# Patient Record
Sex: MALE | Race: WHITE | HISPANIC OR LATINO | Employment: FULL TIME | URBAN - METROPOLITAN AREA
[De-identification: names, ages, dates, MRNs, and addresses within clinical notes are randomized per-mention and may not be internally consistent; named-entity substitution may affect disease eponyms.]

---

## 2017-01-07 ENCOUNTER — HOSPITAL ENCOUNTER (EMERGENCY)
Facility: HOSPITAL | Age: 22
Discharge: HOME | End: 2017-01-07
Attending: EMERGENCY MEDICINE | Admitting: EMERGENCY MEDICINE

## 2017-01-07 VITALS
DIASTOLIC BLOOD PRESSURE: 65 MMHG | BODY MASS INDEX: 22.49 KG/M2 | RESPIRATION RATE: 16 BRPM | OXYGEN SATURATION: 98 % | TEMPERATURE: 98 F | SYSTOLIC BLOOD PRESSURE: 115 MMHG | HEIGHT: 65 IN | HEART RATE: 95 BPM | WEIGHT: 135 LBS

## 2017-01-07 DIAGNOSIS — S93.601A FOOT SPRAIN, RIGHT, INITIAL ENCOUNTER: Primary | ICD-10-CM

## 2017-01-07 PROCEDURE — 99282 EMERGENCY DEPT VISIT SF MDM: CPT | Performed by: EMERGENCY MEDICINE

## 2017-01-07 PROCEDURE — 99283 EMERGENCY DEPT VISIT LOW MDM: CPT

## 2017-01-07 NOTE — ED PROVIDER NOTES
Subjective   History of Present Illness  History of Present Illness    Chief complaint: Foot pain    Location: Right foot, bottom of heel    Quality/Severity:  Moderate pain    Timing/Duration: Constant for 2 days    Modifying Factors: Worse with bearing weight    Narrative: Patient presents for evaluation of new onset pain in his right heel with some bruising.  He says that 2 days ago he hopped out of his bed quickly and landed on the ground forcefully in an awkward way.  He was going to try and run after one of his kids who is in the house but as he took those first few steps out of bed he realized that he had landed awkwardly on his foot and had immediate pain.  Since that time he has had continued pain and has only been able to put weight on the ball of his foot or his toes rather than planting onto his heel is normal.  He denies any other injuries.  He has not had any previous foot or ankle surgeries or problems in the past.    Associated Symptoms: None    Review of Systems   Constitutional: Negative for activity change and fever.   HENT: Negative.    Eyes: Negative for pain and visual disturbance.   Respiratory: Negative for cough and shortness of breath.    Cardiovascular: Negative for chest pain.   Gastrointestinal: Negative for abdominal pain.   Genitourinary: Negative for dysuria.   Musculoskeletal: Positive for arthralgias, gait problem and myalgias.   Skin: Negative for color change.   Neurological: Negative for syncope and headaches.   All other systems reviewed and are negative.      Past Medical History   Diagnosis Date   • Anxiety        Allergies   Allergen Reactions   • Tylenol [Acetaminophen]        Past Surgical History   Procedure Laterality Date   • Incision and drainage abscess         History reviewed. No pertinent family history.    Social History     Social History   • Marital status:      Spouse name: N/A   • Number of children: N/A   • Years of education: N/A     Social History  Main Topics   • Smoking status: Current Every Day Smoker     Packs/day: 0.50     Types: Cigarettes   • Smokeless tobacco: None   • Alcohol use 2.4 oz/week     4 Cans of beer per week   • Drug use: No   • Sexual activity: Not Asked     Other Topics Concern   • None     Social History Narrative   • None         ED Triage Vitals   Temp Heart Rate Resp BP SpO2   01/07/17 1700 01/07/17 1700 01/07/17 1700 01/07/17 1700 01/07/17 1700   98 °F (36.7 °C) 95 16 115/65 98 %      Temp src Heart Rate Source Patient Position BP Location FiO2 (%)   01/07/17 1700 01/07/17 1700 01/07/17 1700 01/07/17 1700 --   Oral Monitor Sitting Right arm        Objective   Physical Exam   Constitutional: He is oriented to person, place, and time. He appears well-developed and well-nourished.   HENT:   Head: Normocephalic and atraumatic.   Eyes: EOM are normal. Pupils are equal, round, and reactive to light. Right eye exhibits no discharge. Left eye exhibits no discharge.   Neck: Normal range of motion. Neck supple.   Cardiovascular: Normal rate and regular rhythm.    Pulmonary/Chest: Effort normal. No respiratory distress.   Musculoskeletal: Normal range of motion. He exhibits tenderness. He exhibits no edema or deformity.   The right plantar surface of the foot show some bruising in the calcaneus region only.  The arch of the foot and the toes are entirely normal and nontender to palpation without any signs of bruising or injury.  There is no tenderness to the Achilles tendon posteriorly at all.  Meyer squeeze test is negative for any suggestion of Achilles injury.  The lateral and medial malleolus are normal and nontender to palpation as well.   Neurological: He is alert and oriented to person, place, and time.   Skin: Skin is warm and dry. No rash noted. No erythema.   Psychiatric: He has a normal mood and affect. His behavior is normal. Judgment and thought content normal.   Nursing note and vitals reviewed.      Procedures         ED  Course  ED Course                  MDM    Final diagnoses:   Foot sprain, right, initial encounter            Abebe Posey MD  01/07/17 4743

## 2017-01-07 NOTE — DISCHARGE INSTRUCTIONS
Rest, ice, and elevate your injured foot as needed.  Please return to the emergency room for any worsening pain, swelling, bruising, weakness, numbness or any other concerns.  Otherwise follow up with the podiatry specialist for further evaluation as we suggested for further testing.

## 2017-04-11 ENCOUNTER — HOSPITAL ENCOUNTER (EMERGENCY)
Facility: HOSPITAL | Age: 22
Discharge: HOME | End: 2017-04-11
Attending: EMERGENCY MEDICINE | Admitting: EMERGENCY MEDICINE

## 2017-04-11 ENCOUNTER — APPOINTMENT (OUTPATIENT)
Dept: GENERAL RADIOLOGY | Facility: HOSPITAL | Age: 22
End: 2017-04-11

## 2017-04-11 VITALS
SYSTOLIC BLOOD PRESSURE: 115 MMHG | DIASTOLIC BLOOD PRESSURE: 67 MMHG | HEIGHT: 64 IN | HEART RATE: 58 BPM | BODY MASS INDEX: 24.75 KG/M2 | WEIGHT: 145 LBS | RESPIRATION RATE: 16 BRPM | OXYGEN SATURATION: 98 % | TEMPERATURE: 98.2 F

## 2017-04-11 DIAGNOSIS — J20.9 ACUTE BRONCHITIS, UNSPECIFIED ORGANISM: Primary | ICD-10-CM

## 2017-04-11 PROCEDURE — 99282 EMERGENCY DEPT VISIT SF MDM: CPT

## 2017-04-11 PROCEDURE — 99284 EMERGENCY DEPT VISIT MOD MDM: CPT | Performed by: EMERGENCY MEDICINE

## 2017-04-11 PROCEDURE — 71020 HC CHEST PA AND LATERAL: CPT

## 2017-04-11 RX ORDER — AMOXICILLIN 500 MG/1
500 CAPSULE ORAL 3 TIMES DAILY
Qty: 21 CAPSULE | Refills: 0 | Status: SHIPPED | OUTPATIENT
Start: 2017-04-11 | End: 2017-04-18

## 2017-04-11 NOTE — DISCHARGE INSTRUCTIONS
Please return to the emergency room for any worsening pain, fevers, coughing, bleeding, difficulties breathing or any other concerns.

## 2017-04-14 NOTE — ED PROVIDER NOTES
Subjective   History of Present Illness  History of Present Illness    Chief complaint: Cough, hemoptysis    Location: Upper respiratory    Quality/Severity:  Mild    Timing/Duration: Persistent for 2 weeks    Modifying Factors: None    Narrative: Patient presents for evaluation of worsening cough symptoms and now some cough with mild streaks of bright red blood since this morning.  He is a half pack per day smoker.  He denies any fevers.  He denies any chest pain or back pain.  He denies any difficulties breathing or any change in his activity levels recently.  He says that for the past 2 weeks he has had a persistent cough that is worse in usual.  It has been occasionally productive with some yellowish phlegm.  This morning, however, when he woke up and began coughing and had some mild streaks of blood which concerned him.  He denies any weight loss.  He has not taken any treatments for this cough.  He denies any sick contacts that are known.    Associated Symptoms: As above    Review of Systems   Constitutional: Negative for activity change, appetite change, chills, fatigue and fever.   HENT: Positive for congestion and rhinorrhea.    Eyes: Negative for pain and visual disturbance.   Respiratory: Positive for cough and wheezing. Negative for chest tightness, shortness of breath and stridor.    Cardiovascular: Negative for chest pain, palpitations and leg swelling.   Gastrointestinal: Negative for abdominal pain, nausea and vomiting.   Genitourinary: Negative for dysuria and flank pain.   Musculoskeletal: Negative for back pain and neck pain.   Skin: Negative for color change and wound.   Neurological: Negative for syncope and headaches.   Psychiatric/Behavioral: Negative for agitation and confusion.   All other systems reviewed and are negative.      Past Medical History:   Diagnosis Date   • Anxiety        Allergies   Allergen Reactions   • Tylenol [Acetaminophen]        Past Surgical History:   Procedure  Laterality Date   • INCISION AND DRAINAGE ABSCESS         History reviewed. No pertinent family history.    Social History     Social History   • Marital status:      Spouse name: N/A   • Number of children: N/A   • Years of education: N/A     Social History Main Topics   • Smoking status: Current Every Day Smoker     Packs/day: 0.50     Types: Cigarettes   • Smokeless tobacco: None   • Alcohol use 2.4 oz/week     4 Cans of beer per week      Comment: monthly   • Drug use: No   • Sexual activity: Not Asked     Other Topics Concern   • None     Social History Narrative   • None     ED Triage Vitals   Temp Heart Rate Resp BP SpO2   04/11/17 1644 04/11/17 1644 04/11/17 1644 04/11/17 1644 04/11/17 1644   98.2 °F (36.8 °C) 58 16 115/67 98 %      Temp src Heart Rate Source Patient Position BP Location FiO2 (%)   -- -- -- -- --                  Objective   Physical Exam   Constitutional: He is oriented to person, place, and time. He appears well-developed and well-nourished. No distress.   HENT:   Head: Normocephalic and atraumatic.   Eyes: EOM are normal. Pupils are equal, round, and reactive to light. Right eye exhibits no discharge. Left eye exhibits no discharge.   Neck: Normal range of motion. Neck supple. No tracheal deviation present.   Cardiovascular: Normal rate, regular rhythm, normal heart sounds and intact distal pulses.  Exam reveals no gallop and no friction rub.    No murmur heard.  Pulmonary/Chest: Effort normal. No respiratory distress. He has no wheezes. He has no rales. He exhibits no tenderness.   Lungs clear bilaterally with normal work of breathing noted.  No coughing throughout the entire history and physical.   Abdominal: Soft. He exhibits no mass. There is no rebound and no guarding. No hernia.   Musculoskeletal: Normal range of motion. He exhibits no edema or deformity.   Neurological: He is alert and oriented to person, place, and time.   Skin: Skin is warm and dry. No rash noted. He is  not diaphoretic. No erythema.   Psychiatric: He has a normal mood and affect. His behavior is normal. Judgment and thought content normal.   Nursing note and vitals reviewed.    RADIOLOGY        Study: Chest x-ray    Findings: No acute disease    Interpreted Contemporaneously by myself, independently viewed by me        Procedures         ED Course  ED Course   Comment By Time   I interpreted the chest x-ray with no acute disease at this time.  Patient vital signs are reassuring.  No evidence of hemoptysis at this time.  Presume his report of blood streaks were likely due to upper respiratory infection.  We'll treat accordingly.  Encouraged him to quit smoking. Abebe Posey MD 04/14 0792                  MDM  Number of Diagnoses or Management Options  Acute bronchitis, unspecified organism:      Amount and/or Complexity of Data Reviewed  Tests in the radiology section of CPT®: ordered and reviewed  Independent visualization of images, tracings, or specimens: yes    Risk of Complications, Morbidity, and/or Mortality  Presenting problems: moderate  Diagnostic procedures: low  Management options: moderate        Final diagnoses:   Acute bronchitis, unspecified organism            Abebe Posey MD  04/14/17 0758

## 2019-12-01 ENCOUNTER — HOSPITAL ENCOUNTER (EMERGENCY)
Facility: HOSPITAL | Age: 24
Discharge: HOME | End: 2019-12-01
Attending: EMERGENCY MEDICINE | Admitting: EMERGENCY MEDICINE

## 2019-12-01 ENCOUNTER — APPOINTMENT (OUTPATIENT)
Dept: GENERAL RADIOLOGY | Facility: HOSPITAL | Age: 24
End: 2019-12-01

## 2019-12-01 ENCOUNTER — HOSPITAL ENCOUNTER (OUTPATIENT)
Dept: CARDIOLOGY | Facility: HOSPITAL | Age: 24
Discharge: HOME | End: 2019-12-01

## 2019-12-01 VITALS
BODY MASS INDEX: 24.75 KG/M2 | WEIGHT: 145 LBS | TEMPERATURE: 98.2 F | RESPIRATION RATE: 16 BRPM | HEIGHT: 64 IN | SYSTOLIC BLOOD PRESSURE: 115 MMHG | DIASTOLIC BLOOD PRESSURE: 80 MMHG | HEART RATE: 66 BPM | OXYGEN SATURATION: 98 %

## 2019-12-01 DIAGNOSIS — R00.2 PALPITATIONS: ICD-10-CM

## 2019-12-01 DIAGNOSIS — R07.9 CHEST PAIN, UNSPECIFIED TYPE: ICD-10-CM

## 2019-12-01 DIAGNOSIS — R00.2 PALPITATIONS: Primary | ICD-10-CM

## 2019-12-01 LAB
ALBUMIN SERPL-MCNC: 4.8 G/DL (ref 3.5–5.2)
ALBUMIN/GLOB SERPL: 1.5 G/DL
ALP SERPL-CCNC: 54 U/L (ref 39–117)
ALT SERPL W P-5'-P-CCNC: 11 U/L (ref 1–41)
AMPHET+METHAMPHET UR QL: NEGATIVE
AMPHETAMINES UR QL: NEGATIVE
ANION GAP SERPL CALCULATED.3IONS-SCNC: 14.3 MMOL/L (ref 5–15)
AST SERPL-CCNC: 16 U/L (ref 1–40)
BARBITURATES UR QL SCN: NEGATIVE
BASOPHILS # BLD AUTO: 0.05 10*3/MM3 (ref 0–0.2)
BASOPHILS NFR BLD AUTO: 0.6 % (ref 0–1.5)
BENZODIAZ UR QL SCN: NEGATIVE
BILIRUB SERPL-MCNC: 1.3 MG/DL (ref 0.2–1.2)
BUN BLD-MCNC: 14 MG/DL (ref 6–20)
BUN/CREAT SERPL: 14.3 (ref 7–25)
BUPRENORPHINE SERPL-MCNC: NEGATIVE NG/ML
CALCIUM SPEC-SCNC: 9.3 MG/DL (ref 8.6–10.5)
CANNABINOIDS SERPL QL: POSITIVE
CHLORIDE SERPL-SCNC: 100 MMOL/L (ref 98–107)
CO2 SERPL-SCNC: 25.7 MMOL/L (ref 22–29)
COCAINE UR QL: NEGATIVE
CREAT BLD-MCNC: 0.98 MG/DL (ref 0.76–1.27)
DEPRECATED RDW RBC AUTO: 40.7 FL (ref 37–54)
EOSINOPHIL # BLD AUTO: 0.03 10*3/MM3 (ref 0–0.4)
EOSINOPHIL NFR BLD AUTO: 0.3 % (ref 0.3–6.2)
ERYTHROCYTE [DISTWIDTH] IN BLOOD BY AUTOMATED COUNT: 13.8 % (ref 12.3–15.4)
ETHANOL BLD-MCNC: <10 MG/DL (ref 0–10)
ETHANOL UR QL: <0.01 %
GFR SERPL CREATININE-BSD FRML MDRD: 94 ML/MIN/1.73
GLOBULIN UR ELPH-MCNC: 3.1 GM/DL
GLUCOSE BLD-MCNC: 104 MG/DL (ref 65–99)
HCT VFR BLD AUTO: 41.8 % (ref 37.5–51)
HGB BLD-MCNC: 13 G/DL (ref 13–17.7)
IMM GRANULOCYTES # BLD AUTO: 0.03 10*3/MM3 (ref 0–0.05)
IMM GRANULOCYTES NFR BLD AUTO: 0.3 % (ref 0–0.5)
LYMPHOCYTES # BLD AUTO: 1.38 10*3/MM3 (ref 0.7–3.1)
LYMPHOCYTES NFR BLD AUTO: 15.6 % (ref 19.6–45.3)
MCH RBC QN AUTO: 25.4 PG (ref 26.6–33)
MCHC RBC AUTO-ENTMCNC: 31.1 G/DL (ref 31.5–35.7)
MCV RBC AUTO: 81.8 FL (ref 79–97)
METHADONE UR QL SCN: NEGATIVE
MONOCYTES # BLD AUTO: 1.04 10*3/MM3 (ref 0.1–0.9)
MONOCYTES NFR BLD AUTO: 11.8 % (ref 5–12)
NEUTROPHILS # BLD AUTO: 6.32 10*3/MM3 (ref 1.7–7)
NEUTROPHILS NFR BLD AUTO: 71.4 % (ref 42.7–76)
NRBC BLD AUTO-RTO: 0 /100 WBC (ref 0–0.2)
OPIATES UR QL: NEGATIVE
OXYCODONE UR QL SCN: NEGATIVE
PCP UR QL SCN: NEGATIVE
PLATELET # BLD AUTO: 335 10*3/MM3 (ref 140–450)
PMV BLD AUTO: 10.1 FL (ref 6–12)
POTASSIUM BLD-SCNC: 4 MMOL/L (ref 3.5–5.2)
PROPOXYPH UR QL: NEGATIVE
PROT SERPL-MCNC: 7.9 G/DL (ref 6–8.5)
RBC # BLD AUTO: 5.11 10*6/MM3 (ref 4.14–5.8)
SODIUM BLD-SCNC: 140 MMOL/L (ref 136–145)
TRICYCLICS UR QL SCN: NEGATIVE
TROPONIN T SERPL-MCNC: <0.01 NG/ML (ref 0–0.03)
TSH SERPL DL<=0.05 MIU/L-ACNC: 1.77 UIU/ML (ref 0.27–4.2)
WBC NRBC COR # BLD: 8.85 10*3/MM3 (ref 3.4–10.8)

## 2019-12-01 PROCEDURE — 93225 XTRNL ECG REC<48 HRS REC: CPT

## 2019-12-01 PROCEDURE — 99284 EMERGENCY DEPT VISIT MOD MDM: CPT | Performed by: EMERGENCY MEDICINE

## 2019-12-01 PROCEDURE — 84443 ASSAY THYROID STIM HORMONE: CPT | Performed by: EMERGENCY MEDICINE

## 2019-12-01 PROCEDURE — 99283 EMERGENCY DEPT VISIT LOW MDM: CPT

## 2019-12-01 PROCEDURE — 71045 X-RAY EXAM CHEST 1 VIEW: CPT

## 2019-12-01 PROCEDURE — 93226 XTRNL ECG REC<48 HR SCAN A/R: CPT

## 2019-12-01 PROCEDURE — 80053 COMPREHEN METABOLIC PANEL: CPT | Performed by: EMERGENCY MEDICINE

## 2019-12-01 PROCEDURE — 93010 ELECTROCARDIOGRAM REPORT: CPT | Performed by: INTERNAL MEDICINE

## 2019-12-01 PROCEDURE — 85025 COMPLETE CBC W/AUTO DIFF WBC: CPT | Performed by: EMERGENCY MEDICINE

## 2019-12-01 PROCEDURE — 93005 ELECTROCARDIOGRAM TRACING: CPT | Performed by: EMERGENCY MEDICINE

## 2019-12-01 PROCEDURE — 84484 ASSAY OF TROPONIN QUANT: CPT | Performed by: EMERGENCY MEDICINE

## 2019-12-01 PROCEDURE — 80307 DRUG TEST PRSMV CHEM ANLYZR: CPT | Performed by: EMERGENCY MEDICINE

## 2019-12-02 ENCOUNTER — TRANSCRIBE ORDERS (OUTPATIENT)
Dept: ADMINISTRATIVE | Facility: HOSPITAL | Age: 24
End: 2019-12-02

## 2019-12-02 DIAGNOSIS — R00.2 PALPITATIONS: Primary | ICD-10-CM

## 2019-12-02 NOTE — ED NOTES
"Pt presented to the ED with c/o intermittent midsternal CP for a year. States last December he had CP that hurt so badly he \"passed out\". He did not seek treatment or go to the doctor and just \"ignored it\" after that. Pt says that for the last year he every now and then will get dull aches of pain mid chest. Says he went drinking with his buddies last week and every since the CP has become more frequent and had sharp shooting pains tonight. Pt says he is not having any \"real pain\" currently. describes it as a dull ache that \"doesn't feel right\". Pt denies any SOB, dizziness, cough. A&Ox4. Ambulates with steady gait. Pt resting comfortably on ED stretcher, is in NAD.      Lexii Espinoza, RN  12/01/19 1952    "

## 2019-12-02 NOTE — ED PROVIDER NOTES
Subjective   History of Present Illness  History of Present Illness    Chief complaint: Chest pain    Location: Substernal    Quality/Severity: pressure,    Timing/Onset/Duration: Intermittent since the night before Thanksgiving    Modifying Factors: Better with time    Associated Symptoms: No headache.  No fever chills or cough.  No sore throat earache or nasal congestion.  No abdominal pain.  No diarrhea or burning when he urinates.  No nausea or vomiting.  Patient did not have shortness of breath.    Narrative: This 24-year-old male presents with intermittent chest pain.  Last December the patient had palpitations with syncope.  He did not follow-up with a physician.  Patient was drinking the night before Thanksgiving and when he woke up he experienced the chest pains.  The patient does smoke marijuana, he drinks alcohol and does not take any other illicit drugs.  He has not had caffeine recently.  He does have a history of anxiety disorder    PCP:  None  Review of Systems   Constitutional: Negative for chills and fever.   HENT: Negative for ear pain and sore throat.    Eyes: Negative for discharge and redness.   Respiratory: Negative for cough, chest tightness and shortness of breath.    Cardiovascular: Positive for chest pain and palpitations. Negative for leg swelling.   Gastrointestinal: Negative for abdominal pain, blood in stool, constipation, diarrhea, nausea and vomiting.   Genitourinary: Negative for difficulty urinating and dysuria.   Musculoskeletal: Negative for back pain and neck pain.   Skin: Negative for rash.   Neurological: Negative for weakness, numbness and headaches.   Psychiatric/Behavioral: Negative.  Negative for confusion.        Medication List      You have not been prescribed any medications.       Past Medical History:   Diagnosis Date   • Anxiety        Allergies   Allergen Reactions   • Tylenol [Acetaminophen]        Past Surgical History:   Procedure Laterality Date   • INCISION AND  DRAINAGE ABSCESS         No family history on file.    Social History     Socioeconomic History   • Marital status:      Spouse name: Not on file   • Number of children: Not on file   • Years of education: Not on file   • Highest education level: Not on file   Tobacco Use   • Smoking status: Current Every Day Smoker     Packs/day: 0.50     Types: Cigarettes   Substance and Sexual Activity   • Alcohol use: Yes     Alcohol/week: 2.4 oz     Types: 4 Cans of beer per week     Comment: monthly   • Drug use: No           Objective   Physical Exam   Constitutional: He is oriented to person, place, and time. He appears well-developed and well-nourished. No distress.   ED Triage Vitals (12/01/19 1943)  Temp: 98.2 °F (36.8 °C)  Heart Rate: 65  Resp: 18  BP: 119/84  SpO2: 99 %  Temp src: Oral  Heart Rate Source: n/a  Patient Position: n/a  BP Location: n/a  FiO2 (%): n/a    The patient's vitals were reviewed by me.  Unless otherwise noted they are within normal limits.     HENT:   Head: Normocephalic and atraumatic.   Right Ear: External ear normal.   Left Ear: External ear normal.   Nose: Nose normal.   Mouth/Throat: Oropharynx is clear and moist.   Eyes: Conjunctivae and EOM are normal. Pupils are equal, round, and reactive to light. Right eye exhibits no discharge. Left eye exhibits no discharge.   Neck: Normal range of motion. Neck supple. No JVD present. No tracheal deviation present. No thyromegaly present.   Cardiovascular: Normal rate, regular rhythm, normal heart sounds and intact distal pulses. Exam reveals no gallop and no friction rub.   No murmur heard.  Pulmonary/Chest: Effort normal and breath sounds normal. No stridor. No respiratory distress. He has no wheezes. He has no rales. He exhibits no tenderness.   Abdominal: Soft. Bowel sounds are normal. He exhibits no distension and no mass. There is no tenderness. There is no rebound and no guarding. No hernia.   Musculoskeletal: Normal range of motion. He  exhibits no edema or deformity.        Right lower leg: Normal. He exhibits no tenderness and no edema.        Left lower leg: Normal. He exhibits no tenderness and no edema.   Lymphadenopathy:     He has no cervical adenopathy.   Neurological: He is alert and oriented to person, place, and time.   Skin: Skin is warm and dry. No rash noted. He is not diaphoretic. No erythema. No pallor.   Psychiatric: His behavior is normal.   Nursing note and vitals reviewed.      Procedures           ED Course  ED Course as of Dec 01 2053   Sun Dec 01, 2019   2043 The laboratory values were reviewed by me.  The urine drug screen is positive for THC.  The glucose is 104.  The laboratory values are otherwise unremarkable.  [RC]      ED Course User Index  [RC] Franky Taylor MD      7:45 PM, 12/01/19:  The EKG was obtained at 1940.  EKG was read by me at 1941.  The EKG shows a sinus bradycardia with a rate of 58.  There is a normal axis with no hypertrophy.  The FL, QRS, QT intervals are unremarkable.  There is no ectopy.  There is ST elevation is probably early re-pole pattern.  There is no acute ST elevation or depression.  8:53 PM, 12/01/19:  The patient was reassessed.  He has no complaints.  He has no chest pain or shortness of breath his vital signs were reviewed and are stable.    8:54 PM, 12/01/19:  Patient's diagnosis of chest pain with palpitations was discussed with him.  The patient should avoid caffeine and stimulants.  Avoid alcohol.  Patient will be set up with a Holter monitor to follow-up with Dr. Mcfarland after the Holter.  Return to the emergency department if there is palpitations, chest pain, shortness of breath, worse in any way at all.            MDM    Final diagnoses:   Palpitations   Chest pain, unspecified type              Franky Taylor MD  12/01/19 4764

## 2019-12-02 NOTE — DISCHARGE INSTRUCTIONS
Avoid caffeine and alcohol.  Follow-up with Dr. Mcfarland after the Holter.  Return to the emergency department if there is further chest pain, shortness of breath, worse in any way at all.

## 2019-12-03 PROCEDURE — 93227 XTRNL ECG REC<48 HR R&I: CPT | Performed by: INTERNAL MEDICINE

## 2020-03-27 ENCOUNTER — HOSPITAL ENCOUNTER (EMERGENCY)
Facility: HOSPITAL | Age: 25
Discharge: HOME | End: 2020-03-27
Attending: EMERGENCY MEDICINE | Admitting: EMERGENCY MEDICINE

## 2020-03-27 ENCOUNTER — NURSE TRIAGE (OUTPATIENT)
Dept: CALL CENTER | Facility: HOSPITAL | Age: 25
End: 2020-03-27

## 2020-03-27 VITALS — WEIGHT: 160 LBS | BODY MASS INDEX: 26.66 KG/M2 | HEIGHT: 65 IN

## 2020-03-27 VITALS
BODY MASS INDEX: 26.66 KG/M2 | HEIGHT: 65 IN | WEIGHT: 160 LBS | OXYGEN SATURATION: 98 % | DIASTOLIC BLOOD PRESSURE: 71 MMHG | HEART RATE: 79 BPM | TEMPERATURE: 98.4 F | SYSTOLIC BLOOD PRESSURE: 121 MMHG | RESPIRATION RATE: 15 BRPM

## 2020-03-27 DIAGNOSIS — G54.2 PINCHED CERVICAL NERVE ROOT: Primary | ICD-10-CM

## 2020-03-27 PROCEDURE — 99282 EMERGENCY DEPT VISIT SF MDM: CPT | Performed by: PHYSICIAN ASSISTANT

## 2020-03-27 PROCEDURE — 99282 EMERGENCY DEPT VISIT SF MDM: CPT

## 2020-03-27 RX ORDER — METHOCARBAMOL 750 MG/1
750 TABLET, FILM COATED ORAL 3 TIMES DAILY PRN
Qty: 20 TABLET | Refills: 0 | Status: SHIPPED | OUTPATIENT
Start: 2020-03-27 | End: 2021-09-07

## 2020-03-27 RX ORDER — ESCITALOPRAM OXALATE 10 MG/1
10 TABLET ORAL DAILY
COMMUNITY
End: 2021-09-07

## 2020-03-27 RX ORDER — NAPROXEN 375 MG/1
375 TABLET ORAL 2 TIMES DAILY PRN
Qty: 20 TABLET | Refills: 0 | Status: SHIPPED | OUTPATIENT
Start: 2020-03-27 | End: 2021-09-07

## 2020-03-27 NOTE — TELEPHONE ENCOUNTER
"He has difficult symptoms, started out with chest pain, then says when raises neck runs down to chest rated pain 5, no radiating to arms or shoulders, no numbness in feet legs or arms, but definetely has pain and radiating when raises neck up or even drinking has pain shooting to chest, has sob with playing with kids outside told needs to be seen.     Reason for Disposition  • [1] SEVERE neck pain (e.g., excruciating, unable to do any normal activities) AND [2] not improved after 2 hours of pain medicine    Additional Information  • Negative: Shock suspected (e.g., cold/pale/clammy skin, too weak to stand, low BP, rapid pulse)  • Negative: Difficult to awaken or acting confused (e.g., disoriented, slurred speech)  • Negative: [1] Similar pain previously AND [2] it was from \"heart attack\"  • Negative: [1] Similar pain previously AND [2] it was from \"angina\" AND [3] not relieved by nitroglycerin  • Negative: Sounds like a life-threatening emergency to the triager  • Negative: Followed a neck injury (e.g., MVA, sports, impact or collision)  • Negative: Chest pain  • Negative: Lymph node in the neck is swollen or painful to the touch  • Negative: Sore throat is main symptom  • Negative: Difficulty breathing or unusual sweating (e.g., sweating without exertion)  • Negative: [1] Stiff neck (can't put chin to chest) AND [2] headache  • Negative: [1] Stiff neck (can't put chin to chest) AND [2] fever  • Negative: Weakness of an arm or hand  • Negative: Problems with bowel or bladder control  • Negative: Head is twisting to one side (or ask \"is it turning against your will?\")  • Negative: Patient sounds very sick or weak to the triager  • Negative: [1] Fever > 100.0 F (37.8 C) AND [2] IVDA (intravenous drug abuse)  • Negative: [1] Fever > 100.0 F (37.8 C) AND [2] diabetes mellitus or weak immune system (e.g., HIV positive, cancer chemo, splenectomy, organ transplant, chronic steroids)  • Negative: Numbness in an arm or hand " "(i.e., loss of sensation)  • Negative: Tenderness or swelling of front of neck over windpipe  • Negative: Rash in same area as pain (may be described as \"small blisters\")  • Negative: High-risk adult (e.g., history of cancer, HIV, or IV drug abuse)    Answer Assessment - Initial Assessment Questions  1. LOCATION: \"Where does it hurt?\"        Middle chest to back if lifts head up  2. RADIATION: \"Does the pain go anywhere else?\" (e.g., into neck, jaw, arms, back)      From neck to back  3. ONSET: \"When did the chest pain begin?\" (Minutes, hours or days)       Few hours ago  4. PATTERN \"Does the pain come and go, or has it been constant since it started?\"  \"Does it get worse with exertion?\"       From neck to chest  5. DURATION: \"How long does it last\" (e.g., seconds, minutes, hours)      Every time he moves head  6. SEVERITY: \"How bad is the pain?\"  (e.g., Scale 1-10; mild, moderate, or severe)     - MILD (1-3): doesn't interfere with normal activities      - MODERATE (4-7): interferes with normal activities or awakens from sleep     - SEVERE (8-10): excruciating pain, unable to do any normal activities        5  7. CARDIAC RISK FACTORS: \"Do you have any history of heart problems or risk factors for heart disease?\" (e.g., prior heart attack, angina; high blood pressure, diabetes, being overweight, high cholesterol, smoking, or strong family history of heart disease)      no  8. PULMONARY RISK FACTORS: \"Do you have any history of lung disease?\"  (e.g., blood clots in lung, asthma, emphysema, birth control pills)      no  9. CAUSE: \"What do you think is causing the chest pain?\"      From my neck maybe does not know  10. OTHER SYMPTOMS: \"Do you have any other symptoms?\" (e.g., dizziness, nausea, vomiting, sweating, fever, difficulty breathing, cough)        Sob with activity, diarrhea  11. PREGNANCY: \"Is there any chance you are pregnant?\" \"When was your last menstrual period?\"        no    Answer Assessment - Initial " "Assessment Questions  1. ONSET: \"When did the pain begin?\"       This morning started  2. LOCATION: \"Where does it hurt?\"       From neck to mid chest  3. PATTERN \"Does the pain come and go, or has it been constant since it started?\"       Comes when takes drink or raises neck  4. SEVERITY: \"How bad is the pain?\"  (Scale 1-10; or mild, moderate, severe)    - MILD (1-3): doesn't interfere with normal activities     - MODERATE (4-7): interferes with normal activities or awakens from sleep     - SEVERE (8-10):  excruciating pain, unable to do any normal activities       5  5. RADIATION: \"Does the pain go anywhere else, shoot into your arms?\"      Radiates from neck to mid chest   6. CORD SYMPTOMS: \"Any weakness or numbness of the arms or legs?\"      no  7. CAUSE: \"What do you think is causing the neck pain?\"      Playing  With kids jumping on [address] but started before this  8. NECK OVERUSE: \"Any recent activities that involved turning or twisting the neck?\"      Nothing yesterday  9. OTHER SYMPTOMS: \"Do you have any other symptoms?\" (e.g., headache, fever, chest pain, difficulty breathing, neck swelling)      Headache, chest pain neck pain moving it up  10. PREGNANCY: \"Is there any chance you are pregnant?\" \"When was your last menstrual period?\"        no    Protocols used: NECK PAIN OR STIFFNESS-ADULT-AH, CHEST PAIN-ADULT-AH      "

## 2021-06-05 ENCOUNTER — HOSPITAL ENCOUNTER (EMERGENCY)
Facility: HOSPITAL | Age: 26
Discharge: HOME | End: 2021-06-05
Attending: EMERGENCY MEDICINE | Admitting: EMERGENCY MEDICINE

## 2021-06-05 VITALS
WEIGHT: 190 LBS | HEART RATE: 81 BPM | RESPIRATION RATE: 17 BRPM | HEIGHT: 66 IN | TEMPERATURE: 98.9 F | BODY MASS INDEX: 30.53 KG/M2 | DIASTOLIC BLOOD PRESSURE: 75 MMHG | SYSTOLIC BLOOD PRESSURE: 125 MMHG | OXYGEN SATURATION: 96 %

## 2021-06-05 DIAGNOSIS — R05.9 COUGH: Primary | ICD-10-CM

## 2021-06-05 DIAGNOSIS — K21.9 GASTROESOPHAGEAL REFLUX DISEASE WITHOUT ESOPHAGITIS: ICD-10-CM

## 2021-06-05 PROCEDURE — 99282 EMERGENCY DEPT VISIT SF MDM: CPT | Performed by: EMERGENCY MEDICINE

## 2021-06-05 PROCEDURE — 99282 EMERGENCY DEPT VISIT SF MDM: CPT

## 2021-06-05 RX ORDER — PANTOPRAZOLE SODIUM 20 MG/1
40 TABLET, DELAYED RELEASE ORAL DAILY
Qty: 30 TABLET | Refills: 0 | Status: SHIPPED | OUTPATIENT
Start: 2021-06-05 | End: 2021-09-07

## 2021-06-05 NOTE — ED PROVIDER NOTES
Subjective   History of Present Illness  History of Present Illness    Chief complaint: Cough and burning     Location: In the throat    Quality/Severity: Minimal to moderate    Timing/Onset/Duration: Present for months    Modifying Factors: Seems to be worse at night when he sleeps    Associated Symptoms: No headache.  No fever chills.  No sore throat earache or nasal congestion.  The cough is dry.  No abdominal pain.  No diarrhea or burning when he urinates.  No black or bloody stools.  No hematemesis or hematochezia.    Narrative: This 26-year-old  male presents with a dry cough that he states happens more at night.  He does not smoke.  The patient is not on ACE inhibitor    PCP: No provider.          Review of Systems   Constitutional: Negative for chills and fever.   HENT: Positive for sore throat. Negative for congestion, ear pain and rhinorrhea.    Respiratory: Negative for shortness of breath.    Cardiovascular: Positive for chest pain (With cough).   Gastrointestinal: Negative for abdominal pain, blood in stool, constipation, diarrhea, nausea and vomiting.   Genitourinary: Negative for dysuria.   Musculoskeletal: Negative for back pain.   Skin: Negative for rash.   Neurological: Negative for headaches.        Medication List      ASK your doctor about these medications    escitalopram 10 MG tablet  Commonly known as: LEXAPRO     methocarbamol 750 MG tablet  Commonly known as: ROBAXIN  Take 1 tablet by mouth 3 (Three) Times a Day As Needed for Muscle Spasms.     naproxen 375 MG tablet  Commonly known as: NAPROSYN  Take 1 tablet by mouth 2 (Two) Times a Day As Needed for Mild Pain . Take with food            Past Medical History:   Diagnosis Date   • Anxiety        Allergies   Allergen Reactions   • Tylenol [Acetaminophen]        Past Surgical History:   Procedure Laterality Date   • INCISION AND DRAINAGE ABSCESS         No family history on file.    Social History     Socioeconomic History   •  Marital status:      Spouse name: Not on file   • Number of children: Not on file   • Years of education: Not on file   • Highest education level: Not on file   Tobacco Use   • Smoking status: Current Every Day Smoker     Packs/day: 0.50     Types: Cigarettes   Substance and Sexual Activity   • Alcohol use: Yes     Alcohol/week: 4.0 standard drinks     Types: 4 Cans of beer per week     Comment: monthly   • Drug use: No           Objective   Physical Exam  Vitals and nursing note reviewed.   Constitutional:       Appearance: Normal appearance.   HENT:      Head: Atraumatic.      Left Ear: Tympanic membrane normal.      Nose: Nose normal. No congestion or rhinorrhea.      Mouth/Throat:      Mouth: Mucous membranes are moist.   Eyes:      Pupils: Pupils are equal, round, and reactive to light.   Cardiovascular:      Rate and Rhythm: Normal rate and regular rhythm.      Pulses: Normal pulses.      Heart sounds: Normal heart sounds. No murmur heard.   No friction rub. No gallop.    Pulmonary:      Effort: Pulmonary effort is normal.      Breath sounds: Normal breath sounds.   Abdominal:      General: Abdomen is flat. Bowel sounds are normal. There is no distension.      Palpations: There is no mass.      Tenderness: There is no abdominal tenderness. There is no guarding or rebound.      Hernia: No hernia is present.   Musculoskeletal:         General: Normal range of motion.      Cervical back: Normal range of motion and neck supple.   Skin:     General: Skin is warm and dry.      Findings: No rash.   Neurological:      General: No focal deficit present.      Mental Status: He is alert and oriented to person, place, and time.         Procedures           ED Course      09:26 EDT, 06/05/21:  The patient's history is consistent with cough generated by reflux.  The patient will be placed on Protonix the patient will be given the patient connection number to establish a primary care provider to follow-up with within  1 week.  The patient should return to the emergency department if there is fever, chills, worsening cough or pain, worse in any way at all.  All the patient's questions were answered she will be discharged in good condition.  All 3 of the nodes that the patient is on do list cough as potential side effect.  Plan will be to treat the patient for reflux, have him follow-up with a primary care provider and adjust medications for symptom control as needed                                     MDM    Final diagnoses:   Cough   Gastroesophageal reflux disease without esophagitis       ED Disposition  ED Disposition     None          No follow-up provider specified.       Medication List      No changes were made to your prescriptions during this visit.          Franky Taylor MD  06/05/21 8403

## 2021-06-05 NOTE — DISCHARGE INSTRUCTIONS
Call the patient connection line for primary care provider to follow-up with within 1 week return to the emergency department if there is increased cough, shortness of breath, fever, worsening chest pain, worse in any way at all.

## 2021-12-22 ENCOUNTER — HOSPITAL ENCOUNTER (EMERGENCY)
Facility: HOSPITAL | Age: 26
Discharge: LEFT BEFORE DISCHARGE | End: 2021-12-22

## 2021-12-22 PROCEDURE — 99211 OFF/OP EST MAY X REQ PHY/QHP: CPT

## 2023-02-02 ENCOUNTER — APPOINTMENT (OUTPATIENT)
Dept: CT IMAGING | Facility: HOSPITAL | Age: 28
End: 2023-02-02

## 2023-02-02 ENCOUNTER — HOSPITAL ENCOUNTER (EMERGENCY)
Facility: HOSPITAL | Age: 28
Discharge: HOME | End: 2023-02-02
Attending: EMERGENCY MEDICINE | Admitting: EMERGENCY MEDICINE

## 2023-02-02 ENCOUNTER — APPOINTMENT (OUTPATIENT)
Dept: GENERAL RADIOLOGY | Facility: HOSPITAL | Age: 28
End: 2023-02-02

## 2023-02-02 VITALS
DIASTOLIC BLOOD PRESSURE: 65 MMHG | SYSTOLIC BLOOD PRESSURE: 122 MMHG | WEIGHT: 165 LBS | HEART RATE: 64 BPM | RESPIRATION RATE: 16 BRPM | HEIGHT: 65 IN | BODY MASS INDEX: 27.49 KG/M2 | OXYGEN SATURATION: 99 % | TEMPERATURE: 98.2 F

## 2023-02-02 DIAGNOSIS — M79.602 LEFT ARM PAIN: ICD-10-CM

## 2023-02-02 DIAGNOSIS — S16.1XXA CERVICAL STRAIN, ACUTE, INITIAL ENCOUNTER: Primary | ICD-10-CM

## 2023-02-02 PROCEDURE — 71045 X-RAY EXAM CHEST 1 VIEW: CPT

## 2023-02-02 PROCEDURE — 72125 CT NECK SPINE W/O DYE: CPT

## 2023-02-02 PROCEDURE — 99282 EMERGENCY DEPT VISIT SF MDM: CPT

## 2023-02-02 RX ORDER — CYCLOBENZAPRINE HCL 10 MG
10 TABLET ORAL 3 TIMES DAILY PRN
Qty: 20 TABLET | Refills: 0 | Status: SHIPPED | OUTPATIENT
Start: 2023-02-02

## 2023-02-02 RX ORDER — NAPROXEN 500 MG/1
500 TABLET ORAL 2 TIMES DAILY PRN
Qty: 20 TABLET | Refills: 0 | Status: SHIPPED | OUTPATIENT
Start: 2023-02-02
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
